# Patient Record
Sex: FEMALE | Race: WHITE | NOT HISPANIC OR LATINO | Employment: UNEMPLOYED | URBAN - METROPOLITAN AREA
[De-identification: names, ages, dates, MRNs, and addresses within clinical notes are randomized per-mention and may not be internally consistent; named-entity substitution may affect disease eponyms.]

---

## 2021-07-29 ENCOUNTER — OFFICE VISIT (OUTPATIENT)
Dept: FAMILY MEDICINE | Facility: CLINIC | Age: 26
End: 2021-07-29
Payer: COMMERCIAL

## 2021-07-29 VITALS
TEMPERATURE: 97.7 F | RESPIRATION RATE: 14 BRPM | BODY MASS INDEX: 43.02 KG/M2 | HEIGHT: 65 IN | DIASTOLIC BLOOD PRESSURE: 83 MMHG | WEIGHT: 258.2 LBS | HEART RATE: 89 BPM | SYSTOLIC BLOOD PRESSURE: 114 MMHG | OXYGEN SATURATION: 98 %

## 2021-07-29 DIAGNOSIS — R59.0 LYMPHADENOPATHY, INGUINAL: ICD-10-CM

## 2021-07-29 DIAGNOSIS — Z00.00 ROUTINE GENERAL MEDICAL EXAMINATION AT A HEALTH CARE FACILITY: Primary | ICD-10-CM

## 2021-07-29 PROCEDURE — 99385 PREV VISIT NEW AGE 18-39: CPT | Performed by: FAMILY MEDICINE

## 2021-07-29 PROCEDURE — 99213 OFFICE O/P EST LOW 20 MIN: CPT | Mod: 25 | Performed by: FAMILY MEDICINE

## 2021-07-29 RX ORDER — DROSPIRENONE AND ETHINYL ESTRADIOL 0.02-3(28)
1 KIT ORAL DAILY
COMMUNITY
End: 2022-11-21

## 2021-07-29 ASSESSMENT — MIFFLIN-ST. JEOR: SCORE: 1910.19

## 2021-07-29 NOTE — PROGRESS NOTES
SUBJECTIVE:   CC: Ashley Darnell is an 25 year old woman who presents for preventive health visit.     Patient presents with:  Physical: Swollen lymph node in the right groin. Patient states she first noticed this one year ago, has shrunk after removal of IUD, but still about the size of an almond.     Patient has been advised of split billing requirements and indicates understanding: Yes     Healthy Habits:    Do you get at least three servings of calcium containing foods daily (dairy, green leafy vegetables, etc.)? yes    Amount of exercise or daily activities, outside of work: Walks; just got a [employer] bike    Problems taking medications regularly No    Medication side effects: No`    Have you had an eye exam in the past two years? yes    Do you see a dentist twice per year? yes    Do you have sleep apnea, excessive snoring or daytime drowsiness?no - but could be sleeping better; melatonin helps as needed      Today's PHQ-2 Score:   PHQ-2 ( 1999 Pfizer) 7/29/2021   Q1: Little interest or pleasure in doing things 1   Q2: Feeling down, depressed or hopeless 1   PHQ-2 Score 2     Abuse: Current or Past(Physical, Sexual or Emotional)- No  Do you feel safe in your environment? Yes    Have you ever done Advance Care Planning? (For example, a Health Directive, POLST, or a discussion with a medical provider or your loved ones about your wishes): No, advance care planning information given to patient to review.  Patient plans to discuss their wishes with loved ones or provider.      Social History     Tobacco Use     Smoking status: Never Smoker     Smokeless tobacco: Never Used   Substance Use Topics     Alcohol use: Yes     Comment: Very infrequently      If you drink alcohol do you typically have >3 drinks per day or >7 drinks per week? No                     Reviewed orders with patient.  Reviewed health maintenance and updated orders accordingly - Yes    FHS-7: No flowsheet data found.    Patient under 40 years  "of age: Routine Mammogram Screening not recommended.   Pertinent mammograms are reviewed under the imaging tab.    Pertinent mammograms are reviewed under the imaging tab.  History of abnormal Pap smear: NO - age 21-29 PAP every 3 years recommended     Reviewed and updated as needed this visit by clinical staff  Tobacco  Allergies  Meds   Med Hx  Surg Hx  Fam Hx  Soc Hx        Reviewed and updated as needed this visit by Provider                  IUD partially expelled.   Lymph node. Last July noticed a giagantic lymph node while in the shower. Never painful. Didn't think much of it.     Did not get biopsy - shrunk after IUD removal - grape sized to peanut.   NO bleeding   No sweats/weight loss/rashes    Pap - 2 normals; next in 2023      ROS:  CONSTITUTIONAL: NEGATIVE for fever, chills, change in weight  INTEGUMENTARY/SKIN: Lymph node enlarged.   EYES: NEGATIVE for vision changes or irritation  ENT: NEGATIVE for ear, mouth and throat problems  RESP: NEGATIVE for significant cough or SOB  BREAST: NEGATIVE for masses, tenderness or discharge  CV: NEGATIVE for chest pain, palpitations or peripheral edema  GI: NEGATIVE for nausea, abdominal pain, heartburn, or change in bowel habits  : NEGATIVE for unusual urinary or vaginal symptoms. Periods are regular.  MUSCULOSKELETAL: NEGATIVE for significant arthralgias or myalgia  NEURO: NEGATIVE for weakness, dizziness or paresthesias  PSYCHIATRIC: NEGATIVE for changes in mood or affect    OBJECTIVE:   /83 (BP Location: Left arm, Patient Position: Sitting, Cuff Size: Adult Large)   Pulse 89   Temp 97.7  F (36.5  C) (Oral)   Resp 14   Ht 1.648 m (5' 4.88\")   Wt 117.1 kg (258 lb 3.2 oz)   LMP 07/15/2021 (Within Days)   SpO2 98%   Breastfeeding No   BMI 43.12 kg/m    EXAM:  GENERAL: healthy, alert and no distress  EYES: Eyes grossly normal to inspection, extraocular movements - intact, and PERRL  HENT: Nose- normal; Mouth- no ulcers, no lesions  NECK: no " "tenderness, no adenopathy, no asymmetry, no masses, no stiffness; thyroid- normal to palpation  RESP: lungs clear to auscultation - no rales, no rhonchi, no wheezes  CV: regular rates and rhythm, normal S1 S2, no S3 or S4 and no murmur, no click or rub -  ABDOMEN: soft, no tenderness, no  hepatosplenomegaly, no masses, normal bowel sounds  MS: extremities- no gross deformities noted, no edema  SKIN: no suspicious lesions, no rashes  NEURO: strength and tone- normal, sensory exam- grossly normal, mentation- intact, speech- normal  PSYCH: Alert and oriented times 3; speech- coherent , normal rate and volume; able to articulate logical thoughts, able to abstract reason, no tangential thoughts, no hallucinations or delusions, affect- normal  LYMPHATICS: ant. cervical- normal, post. cervical- normal, supraclavicular- normal. 1cm rubbery palpable node in the R inguinal region.       ASSESSMENT/PLAN:   1. Routine general medical examination at a health care facility  Discussed HPV and COVID-19 vaccination.   She will consider - may return next week for walk in for COVID vaccine.     2. Lymphadenopathy, inguinal  Reviewed prior imaging and tests - labwork normal (STI screening, CBC), US recommended biopsy. Given improvement, will repeat US first then consider biopsy depending on results.   - US Extremity Non Vascular Right; Future    Patient has been advised of split billing requirements and indicates understanding: Yes  COUNSELING:   Reviewed preventive health counseling, as reflected in patient instructions    Estimated body mass index is 43.12 kg/m  as calculated from the following:    Height as of this encounter: 1.648 m (5' 4.88\").    Weight as of this encounter: 117.1 kg (258 lb 3.2 oz).    She reports that she has never smoked. She has never used smokeless tobacco.      Counseling Resources:  ATP IV Guidelines  Pooled Cohorts Equation Calculator  Breast Cancer Risk Calculator  BRCA-Related Cancer Risk Assessment: " FHS-7 Tool  FRAX Risk Assessment  ICSI Preventive Guidelines  Dietary Guidelines for Americans, 2010  USDA's MyPlate  ASA Prophylaxis  Lung CA Screening    DO EZEQUIEL [facility] APRIL

## 2021-07-29 NOTE — PATIENT INSTRUCTIONS
Nice to see you today!    1) Please call [phone] to schedule your ultrasound   2) Remember that the [facility] will have walk-in appointments for COVID vaccines starting on Monday 8/2/21.       Preventive Health Recommendations  Female Ages 26 - 39  Yearly exam:   See your health care provider every year in order to    Review health changes.     Discuss preventive care.      Review your medicines if you your doctor has prescribed any.    Until age 30: Get a Pap test every three years (more often if you have had an abnormal result).    After age 30: Talk to your doctor about whether you should have a Pap test every 3 years or have a Pap test with HPV screening every 5 years.   You do not need a Pap test if your uterus was removed (hysterectomy) and you have not had cancer.  You should be tested each year for STDs (sexually transmitted diseases), if you're at risk.   Talk to your provider about how often to have your cholesterol checked.  If you are at risk for diabetes, you should have a diabetes test (fasting glucose).  Shots: Get a flu shot each year. Get a tetanus shot every 10 years.   Nutrition:     Eat at least 5 servings of fruits and vegetables each day.    Eat whole-grain bread, whole-wheat pasta and brown rice instead of white grains and rice.    Get adequate Calcium and Vitamin D.     Lifestyle    Exercise at least 150 minutes a week (30 minutes a day, 5 days of the week). This will help you control your weight and prevent disease.    Limit alcohol to one drink per day.    No smoking.     Wear sunscreen to prevent skin cancer.    See your dentist every six months for an exam and cleaning.

## 2021-08-14 ENCOUNTER — HEALTH MAINTENANCE LETTER (OUTPATIENT)
Age: 26
End: 2021-08-14

## 2021-09-01 ENCOUNTER — TELEPHONE (OUTPATIENT)
Dept: FAMILY MEDICINE | Facility: CLINIC | Age: 26
End: 2021-09-01

## 2021-09-01 NOTE — TELEPHONE ENCOUNTER
2021   9:39 AM     Data/Intervention:  Patient Name:  Ashley Darnell  /Age:  1995 (26 year old)    Tried calling: Ashley     Reason for Call:  ACD f/u     Assessment / Plan:  SW reached out to Ashley and wanted to LVM to see if they had any questions or to see if they needed help filling out the Advanced Care Directive Form that Dr. Donald gave them on 21. SW unable to leave voicemail due to phone not connecting to the call twice.     IRINA Arizmendi  Pronouns: She/Her/Hers  , Care Coordination  [facility]  [phone]

## 2021-10-10 ENCOUNTER — HEALTH MAINTENANCE LETTER (OUTPATIENT)
Age: 26
End: 2021-10-10

## 2022-09-18 ENCOUNTER — HEALTH MAINTENANCE LETTER (OUTPATIENT)
Age: 27
End: 2022-09-18

## 2022-10-12 ENCOUNTER — OFFICE VISIT (OUTPATIENT)
Dept: FAMILY MEDICINE | Facility: CLINIC | Age: 27
End: 2022-10-12
Payer: COMMERCIAL

## 2022-10-12 ENCOUNTER — HOSPITAL ENCOUNTER (OUTPATIENT)
Dept: ULTRASOUND IMAGING | Facility: HOSPITAL | Age: 27
Discharge: HOME | End: 2022-10-12
Attending: STUDENT IN AN ORGANIZED HEALTH CARE EDUCATION/TRAINING PROGRAM | Admitting: STUDENT IN AN ORGANIZED HEALTH CARE EDUCATION/TRAINING PROGRAM
Payer: COMMERCIAL

## 2022-10-12 VITALS
OXYGEN SATURATION: 98 % | HEART RATE: 89 BPM | WEIGHT: 293 LBS | BODY MASS INDEX: 49.92 KG/M2 | DIASTOLIC BLOOD PRESSURE: 80 MMHG | RESPIRATION RATE: 20 BRPM | SYSTOLIC BLOOD PRESSURE: 130 MMHG | TEMPERATURE: 98.5 F

## 2022-10-12 DIAGNOSIS — R22.9 SUBCUTANEOUS NODULE: Primary | ICD-10-CM

## 2022-10-12 DIAGNOSIS — R22.9 SUBCUTANEOUS NODULE: ICD-10-CM

## 2022-10-12 LAB — RADIOLOGIST FLAGS: ABNORMAL

## 2022-10-12 PROCEDURE — 99214 OFFICE O/P EST MOD 30 MIN: CPT | Performed by: STUDENT IN AN ORGANIZED HEALTH CARE EDUCATION/TRAINING PROGRAM

## 2022-10-12 PROCEDURE — 76882 US LMTD JT/FCL EVL NVASC XTR: CPT | Mod: RT

## 2022-10-12 ASSESSMENT — PAIN SCALES - GENERAL: PAINLEVEL: MILD PAIN (2)

## 2022-10-12 NOTE — PROGRESS NOTES
"  Assessment and Plan     27-year-old female who presents with subcutaneous nodule in her right groin going on the last 2 years.  Previously worked up with negative STI screening and ultrasounds that were most concerning for an abnormally enlarged lymph node.  Biopsy was recommended but she did not go through with this at that time.  Size of nodule has waxed and waned since then but recently became quite large again.  Still not painful with no drainage.  On exam today I think this is either a lymph node or possibly a cyst as it is quite fluctuant on exam.  Attempted point-of-care ultrasound but machine was broken.  We will send for formal ultrasound and also an IR consult to either perform drainage or biopsy if abnormally enlarged lymph node.    1. Subcutaneous nodule  - US Hernia Evaluation; Future  - IR Referral; Future    Follow up: PRN  Options for treatment and follow-up care were reviewed with the patient and/or guardian. Ashley Darnell and/or guardian engaged in the decision making process and verbalized understanding of the options discussed and agreed with the final plan.    Dr. Jim Lynn         HPI:   Ashley Darnell is a 27 year old  female who presents for:    Chief Complaint   Patient presents with     lymph node     Swollen lymph node in groin. Started in 2020. Ultrasound done at that time, IUD was expelled into cervix. IUD removed and lymph node shrunk. Hasn't gone away completely and grew a little bit in the last few weeks. No pain until patient manually checked it. Size goes up and down about a mm. Bee sting on left leg. Not sure if relevant to issue. Has been exercise regularly in the last few weeks as well.      patient tellls me that in 2020 she noticed a \"swollen lymph node\" in her groin.  She had a ultrasound of her pelvis and found that IUD was partially expelled. US looked like lymph node. IUD was removed and lymph node shrunk some. Seemd ed to wax and wane over the years. Grown recently " and now the size of the gumball. Only changes she has had in thelast two weks was walking daily and using peleton now.  Denies concerns for sexually transmitted diseases and states she was initially tested when this for started about 2 years ago.  All normal.  She has not had any urinary symptoms or vaginal symptoms lately.    Answers for HPI/ROS submitted by the patient on 10/12/2022  What is the reason for your visit today? : Swollen Lymph Node  How many servings of fruits and vegetables do you eat daily?: 0-1  On average, how many sweetened beverages do you drink each day (Examples: soda, juice, sweet tea, etc.  Do NOT count diet or artificially sweetened beverages)?: 0  How many minutes a day do you exercise enough to make your heart beat faster?: 20 to 29  How many days a week do you exercise enough to make your heart beat faster?: 5  How many days per week do you miss taking your medication?: 0         PMHX:   There is no problem list on file for this patient.      Current Outpatient Medications   Medication Sig Dispense Refill     drospirenone-ethinyl estradiol (BC) 3-0.02 MG tablet Take 1 tablet by mouth daily (Patient not taking: Reported on 10/12/2022)         Social History     Tobacco Use     Smoking status: Never     Smokeless tobacco: Never   Substance Use Topics     Alcohol use: Yes     Comment: Very infrequently        Social History     Social History Narrative     Not on file       No Known Allergies    No results found for this or any previous visit (from the past 24 hour(s)).         Review of Systems:    ROS: 10 point ROS neg other than the symptoms noted above in the HPI.         Physical Exam:     Vitals:    10/12/22 1200   BP: 130/80   BP Location: Right arm   Patient Position: Sitting   Cuff Size: Adult Large   Pulse: 89   Resp: 20   Temp: 98.5  F (36.9  C)   TempSrc: Temporal   SpO2: 98%   Weight: 135.6 kg (298 lb 14.4 oz)     Body mass index is 49.92 kg/m .    General appearance: Alert,  cooperative, no distress, appears stated age  Head: Normocephalic, atraumatic, without obvious abnormality  Eyes: Pupils equal round, reactive.  Conjunctiva clear.  Nose: Nares normal, no drainage.  Throat: Lips, mucosa, tongue normal mucosa pink and moist  Neck: Supple, symmetric, trachea midline  Uro/Gyn:  Inspection of external genitalia shows a normal anatomic appearance with pubic hair noted. No atrophy or sclerosis noted. Large 3 cm diameter approximate subcutaneous nodule in right groin that is fluctuant to palpation, mildly reddened but not painful.  Attempted to do point-of-care ultrasound but machine was broken in clinic.

## 2022-10-19 ENCOUNTER — MYC MEDICAL ADVICE (OUTPATIENT)
Dept: INTERVENTIONAL RADIOLOGY/VASCULAR | Facility: CLINIC | Age: 27
End: 2022-10-19

## 2022-10-19 NOTE — TELEPHONE ENCOUNTER
Writer spoke with Ashley regarding planned procedure with IR via telephone.  She acknowledges understanding of pre-procedure instructions.   Ashley plans on a home rapid COVID test prior to procedure date.  I have provided Ashley with IR number ([phone]) for questions or concerns.    Lorie WESTFALL  Interventional Radiology RN   [phone]

## 2022-10-27 ENCOUNTER — HOSPITAL ENCOUNTER (OUTPATIENT)
Dept: ULTRASOUND IMAGING | Facility: HOSPITAL | Age: 27
Discharge: HOME | End: 2022-10-27
Attending: STUDENT IN AN ORGANIZED HEALTH CARE EDUCATION/TRAINING PROGRAM | Admitting: RADIOLOGY
Payer: COMMERCIAL

## 2022-10-27 DIAGNOSIS — R22.9 SUBCUTANEOUS NODULE: ICD-10-CM

## 2022-10-27 PROCEDURE — 88333 PATH CONSLTJ SURG CYTO XM 1: CPT | Mod: TC | Performed by: STUDENT IN AN ORGANIZED HEALTH CARE EDUCATION/TRAINING PROGRAM

## 2022-10-27 PROCEDURE — 87102 FUNGUS ISOLATION CULTURE: CPT | Performed by: STUDENT IN AN ORGANIZED HEALTH CARE EDUCATION/TRAINING PROGRAM

## 2022-10-27 PROCEDURE — 38505 NEEDLE BIOPSY LYMPH NODES: CPT

## 2022-10-27 PROCEDURE — 88341 IMHCHEM/IMCYTCHM EA ADD ANTB: CPT | Mod: 26 | Performed by: PATHOLOGY

## 2022-10-27 PROCEDURE — 88360 TUMOR IMMUNOHISTOCHEM/MANUAL: CPT | Mod: 26 | Performed by: PATHOLOGY

## 2022-10-27 PROCEDURE — 87116 MYCOBACTERIA CULTURE: CPT | Performed by: STUDENT IN AN ORGANIZED HEALTH CARE EDUCATION/TRAINING PROGRAM

## 2022-10-27 PROCEDURE — 88333 PATH CONSLTJ SURG CYTO XM 1: CPT | Mod: 26 | Performed by: PATHOLOGY

## 2022-10-27 PROCEDURE — 87206 SMEAR FLUORESCENT/ACID STAI: CPT | Performed by: STUDENT IN AN ORGANIZED HEALTH CARE EDUCATION/TRAINING PROGRAM

## 2022-10-27 PROCEDURE — 272N000221 US BIOPSY CORE LYMPH NODE

## 2022-10-27 PROCEDURE — 88305 TISSUE EXAM BY PATHOLOGIST: CPT | Mod: 26 | Performed by: PATHOLOGY

## 2022-10-27 PROCEDURE — 87070 CULTURE OTHR SPECIMN AEROBIC: CPT | Performed by: STUDENT IN AN ORGANIZED HEALTH CARE EDUCATION/TRAINING PROGRAM

## 2022-10-27 PROCEDURE — 87075 CULTR BACTERIA EXCEPT BLOOD: CPT | Performed by: STUDENT IN AN ORGANIZED HEALTH CARE EDUCATION/TRAINING PROGRAM

## 2022-10-27 PROCEDURE — 88313 SPECIAL STAINS GROUP 2: CPT | Mod: 26 | Performed by: PATHOLOGY

## 2022-10-27 PROCEDURE — 87077 CULTURE AEROBIC IDENTIFY: CPT | Performed by: STUDENT IN AN ORGANIZED HEALTH CARE EDUCATION/TRAINING PROGRAM

## 2022-10-27 PROCEDURE — 88305 TISSUE EXAM BY PATHOLOGIST: CPT | Mod: TC | Performed by: STUDENT IN AN ORGANIZED HEALTH CARE EDUCATION/TRAINING PROGRAM

## 2022-10-27 PROCEDURE — 88342 IMHCHEM/IMCYTCHM 1ST ANTB: CPT | Mod: 26 | Performed by: PATHOLOGY

## 2022-10-28 ENCOUNTER — MYC MEDICAL ADVICE (OUTPATIENT)
Dept: FAMILY MEDICINE | Facility: CLINIC | Age: 27
End: 2022-10-28

## 2022-10-28 NOTE — TELEPHONE ENCOUNTER
"I called patient. I explained to her that the \"result\" she is seeing in her My Chart is notes from the biopsy procedure itself. I assured patient that Dr. Estrada will be in contact with the patient once he receives the results. Patient verbalizes understanding and has no further questions.   "

## 2022-10-31 LAB — BACTERIA TISS BX CULT: ABNORMAL

## 2022-10-31 NOTE — RESULT ENCOUNTER NOTE
I called and spoke with the patient about their recent clinic visit results. I answered any questions they had.  Patient tells me that since I saw the subcutaneous nodule has significantly decreased in size and is not painful red and with no discharge.  I think culture represents a contaminant and is likely not an abscess.  Thus he will not start any antibiotic.  Awaiting cytology results.      Dr. Jim Lynn

## 2022-11-04 LAB — BACTERIA TISS BX CULT: NORMAL

## 2022-11-11 ENCOUNTER — TELEPHONE (OUTPATIENT)
Dept: FAMILY MEDICINE | Facility: CLINIC | Age: 27
End: 2022-11-11

## 2022-11-11 NOTE — TELEPHONE ENCOUNTER
Reason for call:  Other     Patient called regarding (reason for call): call back    Additional comments: Dr. Wren called to speak with Dr. Estrada. States this is a lymph node biopsy from R groin and it's a fairly challenging lesion. Thinks it's a neoplasm, sent to [facility] for further workup. Wants to be sure it's not a vascular neoplasm. He isn't comfortable offering a differential dx at this time. The number he gave is to [facility] and he'll be at [facility] next week; he said the person answering the phone will be able to get the call over to him.

## 2022-11-14 NOTE — TELEPHONE ENCOUNTER
Called number provided.  Dr. Wren was not available.  I gave my personal cell phone number to have that physician call me back to discuss this case.    Jim Lynn MD

## 2022-11-15 ENCOUNTER — TELEPHONE (OUTPATIENT)
Dept: FAMILY MEDICINE | Facility: CLINIC | Age: 27
End: 2022-11-15

## 2022-11-15 NOTE — TELEPHONE ENCOUNTER
Reason for call:  Other     Patient called regarding (reason for call): call back    Additional comments: Mom calling to request someone call pt to discuss biopsy results. Reports pt has been up at night vomiting due to anxiety.     Phone number to reach patient:  Cell number on file:    Telephone Information:   Mobile [phone]       Best Time:  Any    Can we leave a detailed message on this number?  YES

## 2022-11-21 DIAGNOSIS — D49.89 HISTIOCYTOMA: Primary | ICD-10-CM

## 2022-11-22 ENCOUNTER — PATIENT OUTREACH (OUTPATIENT)
Dept: ONCOLOGY | Facility: CLINIC | Age: 27
End: 2022-11-22

## 2022-11-22 NOTE — PROGRESS NOTES
New Patient Oncology Nurse Navigator Note     Referring provider: Dr. Estrada     Referring Clinic/Organization: [facility]     Referred to: Surgical Oncology - Soft Tissue      Requested provider (if applicable): First available provider    Referral Received: 11/22/22       Evaluation for : right groin mass        Clinical History (per Nurse review of records provided):      See book marked documents:       Referring MD office note    Pathology report    Imaging reports     No results found for: , ALBUMIN, AST, ALT, ALKPHOS, BILITOTAL, LDPLT, WBC, LDRBC       Past Medical History:   Diagnosis Date     LGSIL on Pap smear of cervix 08/2018    plan cotesting 8/2019       No past surgical history on file.    No current outpatient medications on file.         No Known Allergies       There is no problem list on file for this patient.        Clinical Assessment / Barriers to Care (Per Nurse):    None at this time.     Records Location:     [facility]     Records Needed:     NONE AT THIS TIME      Additional testing needed prior to consult:     NONE AT THIS TIME    Referral updates and Plan:       Consult with Surgical Oncology     Alyssa Darnell RN, BSN   Surgical Oncology New Patient Nurse Navigator  [facility]  [phone]

## 2022-11-25 LAB — BACTERIA TISS BX CULT: NO GROWTH

## 2022-12-08 ENCOUNTER — OFFICE VISIT (OUTPATIENT)
Dept: RADIATION THERAPY | Facility: OUTPATIENT CENTER | Age: 27
End: 2022-12-08
Attending: STUDENT IN AN ORGANIZED HEALTH CARE EDUCATION/TRAINING PROGRAM
Payer: MEDICAID

## 2022-12-08 VITALS
RESPIRATION RATE: 16 BRPM | WEIGHT: 293 LBS | BODY MASS INDEX: 50.77 KG/M2 | HEART RATE: 99 BPM | OXYGEN SATURATION: 99 %

## 2022-12-08 DIAGNOSIS — D49.89 HISTIOCYTOMA: ICD-10-CM

## 2022-12-08 ASSESSMENT — PAIN SCALES - GENERAL: PAINLEVEL: NO PAIN (0)

## 2022-12-08 NOTE — NURSING NOTE
"Oncology Rooming Note    December 8, 2022 12:09 PM   Ashley Darnell is a 27 year old female who presents for:    Chief Complaint   Patient presents with     Oncology Clinic Visit     New Surgical Oncology Consult with Dr. Nunez- R groin mass- histiocytoma     Initial Vitals: Pulse 99   Resp 16   Wt 137.9 kg (304 lb)   SpO2 99%   BMI 50.77 kg/m   Estimated body mass index is 50.77 kg/m  as calculated from the following:    Height as of 7/29/21: 1.648 m (5' 4.88\").    Weight as of this encounter: 137.9 kg (304 lb). Body surface area is 2.51 meters squared.  No Pain (0) Comment: Data Unavailable   No LMP recorded.  Allergies reviewed: Yes  Medications reviewed: Yes    Medications: Medication refills not needed today.  Pharmacy name entered into [employer]: [id number] - [place], [place] - [id number]    Clinical concerns: New Surgical Oncology Consult- R groin mass- histiocytoma Dr. Nunez was notified.      Sonya Elkins RN              "

## 2022-12-08 NOTE — PROGRESS NOTES
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  Ashley first noticed a lump there more than 2 years ago.  At the time, she thought it was a lymph node because she was having some difficulty with her IUD.  The lump was evaluated with an ultrasound.  This was thought to be a benign lymph node and the mass decreased in size.  However, more recently, it began to increase again.  She had an ultrasound on 10/12 which demonstrated a 3.9 cm mass and she underwent a fine-needle aspiration which demonstrated a deep fibrous histiocytoma.  Pathology was reviewed at the [facility].  She is now here to talk about treatment options.  She states that it does not hurt her.  She has no symptoms.  She has had no previous similar masses.    PHYSICAL EXAMINATION:  She has a visible subcutaneous nodule high in the right groin and does not appear to be very deep.    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    PLAN:  I have recommended complete excision of this growing lesion.  I have told her that the major risk of this procedure is infection.  We will tentatively schedule her for an excision of this lesion.    Total time was 30 minutes which included reviewing her imaging, in-person visit and coordinating her care.

## 2022-12-08 NOTE — LETTER
12/8/2022         RE: Ashley Darnell  [address]  [place] [address]        Dear Colleague,    Thank you for referring your patient, Ashley Darnell, to the [facility]. Please see a copy of my visit note below.    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  Ashley first noticed a lump there more than 2 years ago.  At the time, she thought it was a lymph node because she was having some difficulty with her IUD.  The lump was evaluated with an ultrasound.  This was thought to be a benign lymph node and the mass decreased in size.  However, more recently, it began to increase again.  She had an ultrasound on 10/12 which demonstrated a 3.9 cm mass and she underwent a fine-needle aspiration which demonstrated a deep fibrous histiocytoma.  Pathology was reviewed at the [facility].  She is now here to talk about treatment options.  She states that it does not hurt her.  She has no symptoms.  She has had no previous similar masses.    PHYSICAL EXAMINATION:  She has a visible subcutaneous nodule high in the right groin and does not appear to be very deep.    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    PLAN:  I have recommended complete excision of this growing lesion.  I have told her that the major risk of this procedure is infection.  We will tentatively schedule her for an excision of this lesion.    Total time was 30 minutes which included reviewing her imaging, in-person visit and coordinating her care.      Again, thank you for allowing me to participate in the care of your patient.      Sincerely,      Tam Nunez MD

## 2022-12-15 ENCOUNTER — PATIENT OUTREACH (OUTPATIENT)
Dept: RADIATION THERAPY | Facility: OUTPATIENT CENTER | Age: 27
End: 2022-12-15

## 2022-12-15 ENCOUNTER — PREP FOR PROCEDURE (OUTPATIENT)
Dept: RADIATION THERAPY | Facility: OUTPATIENT CENTER | Age: 27
End: 2022-12-15

## 2022-12-15 DIAGNOSIS — D49.89 HISTIOCYTOMA: Primary | ICD-10-CM

## 2022-12-15 NOTE — TELEPHONE ENCOUNTER
I contacted the patient via telephone to confirm the scheduled dates and provide the following information:     Surgeon/surgery date/location: Excision of Right groin mass with Dr. Nunez on Thursday, December 29, 2022 at 10:00 am at [facility].     Arrival: Informed patient that a pre-op RN will contact her 1-2 days prior to surgery to review arrival time.    Pre-op consult: Dr. Nunez on 12/8/22    Pre-op physical with: Patient will contact PCP to schedule.     COVID-19 test: Patient was informed she does not need to do a COVID test for same day surgery, she is to call if she develops symptoms of COVID before her surgery date.     Post-op: 1/12/22 at 9:15 am    The surgery packet was provided during consult in clinic.     Sonya Elkins RN Surgical Oncology

## 2022-12-23 LAB
ACID FAST STAIN (ARUP): NORMAL

## 2022-12-27 ENCOUNTER — OFFICE VISIT (OUTPATIENT)
Dept: FAMILY MEDICINE | Facility: CLINIC | Age: 27
End: 2022-12-27
Payer: MEDICAID

## 2022-12-27 VITALS
DIASTOLIC BLOOD PRESSURE: 80 MMHG | OXYGEN SATURATION: 99 % | SYSTOLIC BLOOD PRESSURE: 128 MMHG | BODY MASS INDEX: 48.82 KG/M2 | WEIGHT: 293 LBS | RESPIRATION RATE: 16 BRPM | HEART RATE: 81 BPM | HEIGHT: 65 IN

## 2022-12-27 DIAGNOSIS — D49.89 HISTIOCYTOMA: Primary | ICD-10-CM

## 2022-12-27 DIAGNOSIS — Z11.4 SCREENING FOR HIV (HUMAN IMMUNODEFICIENCY VIRUS): ICD-10-CM

## 2022-12-27 DIAGNOSIS — Z01.818 PREOP GENERAL PHYSICAL EXAM: ICD-10-CM

## 2022-12-27 DIAGNOSIS — Z11.59 NEED FOR HEPATITIS C SCREENING TEST: ICD-10-CM

## 2022-12-27 DIAGNOSIS — T75.3XXS MOTION SICKNESS, SEQUELA: ICD-10-CM

## 2022-12-27 DIAGNOSIS — F40.232 FEAR OF OTHER MEDICAL CARE: ICD-10-CM

## 2022-12-27 PROCEDURE — 99214 OFFICE O/P EST MOD 30 MIN: CPT | Performed by: FAMILY MEDICINE

## 2022-12-27 RX ORDER — ONDANSETRON 4 MG/1
4 TABLET, ORALLY DISINTEGRATING ORAL EVERY 8 HOURS PRN
Qty: 30 TABLET | Refills: 0 | Status: SHIPPED | OUTPATIENT
Start: 2022-12-27

## 2022-12-27 RX ORDER — HYDROXYZINE HYDROCHLORIDE 10 MG/1
10-30 TABLET, FILM COATED ORAL EVERY 6 HOURS PRN
Qty: 30 TABLET | Refills: 0 | Status: SHIPPED | OUTPATIENT
Start: 2022-12-27

## 2022-12-27 NOTE — H&P (VIEW-ONLY)
[address] [address]  Phone: [phone]  Fax: [phone]  Primary Provider: No Ref-Primary, Physician  Pre-op Performing Provider: GABRIELA BASURTO      PREOPERATIVE EVALUATION:  Today's date: 12/27/2022    Ashley Darnell is a 27 year old female who presents for a preoperative evaluation.    Surgical Information:  Surgery/Procedure: deep cytoma removal  Surgery Location: [facility]  Surgeon: Dr. Nunez  Surgery Date: 12/29  Time of Surgery:   Where patient plans to recover: At home with family  Fax number for surgical facility: Note does not need to be faxed, will be available electronically in Epic.    Type of Anesthesia Anticipated: to be determined    Assessment & Plan     The proposed surgical procedure is considered LOW risk.    Problem List Items Addressed This Visit    None  Visit Diagnoses     Histiocytoma    -  Primary    Screening for HIV (human immunodeficiency virus)        Need for hepatitis C screening test        Fear of other medical care        Relevant Medications    hydrOXYzine (ATARAX) 10 MG tablet    Motion sickness, sequela        Relevant Medications    ondansetron (ZOFRAN ODT) 4 MG ODT tab    Preop general physical exam            Patient is an ASA category 3 based on weight.  Otherwise no chronic medical conditions that put her at a high risk for surgery.  She does have a notable fear of medical care.  We are going to give her some hydroxyzine that she can use as needed for this leading up to surgery.  Also explained to her that if she needs narcotics after surgery and has some itchiness this might help with the itchiness also.    She has a history of motion sickness and her mom is always reacted poorly to anesthesia with a lot of nausea and vomiting.  I am giving her some ondansetron IM to try for her motion sickness.  May be worthwhile with treating her with some ondansetron prior to surgery also.            RECOMMENDATION:  APPROVAL GIVEN to proceed with proposed procedure, without further diagnostic evaluation.            Subjective     HPI related to upcoming procedure: pt has a histiocytosis that needs excision.  She also reports that she occasionally experiences motion sickness which may be a consideration for anesthesia.  Her dad has history of venal thrombosis disease.  No known mutations.  He has been anticoagulated since his 30s.  Patient has never had blood clot.    Preop Questions 12/27/2022   1. Have you ever had a heart attack or stroke? No   2. Have you ever had surgery on your heart or blood vessels, such as a stent placement, a coronary artery bypass, or surgery on an artery in your head, neck, heart, or legs? No   3. Do you have chest pain with activity? No   4. Do you have a history of  heart failure? No   5. Do you currently have a cold, bronchitis or symptoms of other infection? No   6. Do you have a cough, shortness of breath, or wheezing? No   7. Do you or anyone in your family have previous history of blood clots? YES    8. Do you or does anyone in your family have a serious bleeding problem such as prolonged bleeding following surgeries or cuts? No   9. Have you ever had problems with anemia or been told to take iron pills? No   10. Have you had any abnormal blood loss such as black, tarry or bloody stools, or abnormal vaginal bleeding? No   11. Have you ever had a blood transfusion? No   12. Are you willing to have a blood transfusion if it is medically needed before, during, or after your surgery? Yes   13. Have you or any of your relatives ever had problems with anesthesia? No   14. Do you have sleep apnea, excessive snoring or daytime drowsiness? No   15. Do you have any artifical heart valves or other implanted medical devices like a pacemaker, defibrillator, or continuous glucose monitor? No   16. Do you have artificial joints? No   17. Are you allergic to latex? No   18. Is there  "any chance that you may be pregnant? No       Health Care Directive:  Patient does not have a Health Care Directive or Living Will: Discussed advance care planning with patient; information given to patient to review.    Preoperative Review of :   reviewed - no record of controlled substances prescribed.  {Review MNPMP for all patients per [facility] MNPMP Profile:  Review of Systems  Neg except as per HPI    There are no problems to display for this patient.     Past Medical History:   Diagnosis Date     LGSIL on Pap smear of cervix 08/2018    plan cotesting 8/2019     Motion sickness      History reviewed. No pertinent surgical history.  No previous surgeries  Current Outpatient Medications   Medication Sig Dispense Refill     hydrOXYzine (ATARAX) 10 MG tablet Take 1-3 tablets (10-30 mg) by mouth every 6 hours as needed for itching or anxiety 30 tablet 0     ondansetron (ZOFRAN ODT) 4 MG ODT tab Take 1 tablet (4 mg) by mouth every 8 hours as needed for nausea or vomiting 30 tablet 0   no current meds, encouraged pt to take folic acid 400 mcg daily    No Known Allergies     Social History     Tobacco Use     Smoking status: Never     Smokeless tobacco: Never   Substance Use Topics     Alcohol use: Yes     Comment: Very infrequently      Family History   Problem Relation Age of Onset     Anxiety Disorder Mother      Blood Disease Father         Blood clotting disorder      Diabetes Type 2  Father      Colon Cancer Maternal Grandmother      Breast Cancer Paternal Grandmother      History   Drug Use Not on file         Objective     /80 (BP Location: Right arm, Patient Position: Sitting)   Pulse 81   Resp 16   Ht 1.645 m (5' 4.75\")   Wt 136.9 kg (301 lb 12.8 oz)   SpO2 99%   BMI 50.61 kg/m      Physical Exam        General: alert and oriented ×3 no acute distress.    HEENT: Normocephalic and atraumatic.   Eyes pupils are equal round and reactive to light extraocular motion is intact. normal " conjunctiva    Hearing is grossly normal and there is no otorrhea. Tympanic membranes are pearly grey with a normal light reflex.    Nares are patent there is no rhinorrhea.     Mucous membranes are moist and pink.    Chest: has bilateral rise with no increased work of breathing. clear to auscultation without wheezes, rhonchi, or rales.    Cardiovascular: normal perfusion and brisk capillary refill. S1S2 with regular rate and rhythm and no murmurs, gallops or rubs.    Musculoskeletal: no gross focal abnormalities and normal gait.    Neuro: no gross focal abnormalities and memory seems intact. CN 2-12 are grossly intact.    Psychiatric: speech is clear and coherent and fluent. Patient dressed appropriately for the weather. Mood is appropriate and affect is full.        No results for input(s): HGB, PLT, INR, NA, POTASSIUM, CR, A1C in the last 24343 hours.     Diagnostics:  No labs were ordered during this visit.   No EKG required for low risk surgery (cataract, skin procedure, breast biopsy, etc).    Revised Cardiac Risk Index (RCRI):  The patient has the following serious cardiovascular risks for perioperative complications:   - No serious cardiac risks = 0 points     RCRI Interpretation: 0 points: Class I (very low risk - 0.4% complication rate)           Signed Electronically by: Nadege Jenkins MD  Copy of this evaluation report is provided to requesting physician.

## 2022-12-27 NOTE — PROGRESS NOTES
[address] [address]  Phone: [phone]  Fax: [phone]  Primary Provider: No Ref-Primary, Physician  Pre-op Performing Provider: GABRIELA BASURTO      PREOPERATIVE EVALUATION:  Today's date: 12/27/2022    Ashley Darnell is a 27 year old female who presents for a preoperative evaluation.    Surgical Information:  Surgery/Procedure: deep cytoma removal  Surgery Location: [facility]  Surgeon: Dr. Nunez  Surgery Date: 12/29  Time of Surgery:   Where patient plans to recover: At home with family  Fax number for surgical facility: Note does not need to be faxed, will be available electronically in Epic.    Type of Anesthesia Anticipated: to be determined    Assessment & Plan     The proposed surgical procedure is considered LOW risk.    Problem List Items Addressed This Visit    None  Visit Diagnoses     Histiocytoma    -  Primary    Screening for HIV (human immunodeficiency virus)        Need for hepatitis C screening test        Fear of other medical care        Relevant Medications    hydrOXYzine (ATARAX) 10 MG tablet    Motion sickness, sequela        Relevant Medications    ondansetron (ZOFRAN ODT) 4 MG ODT tab    Preop general physical exam            Patient is an ASA category 3 based on weight.  Otherwise no chronic medical conditions that put her at a high risk for surgery.  She does have a notable fear of medical care.  We are going to give her some hydroxyzine that she can use as needed for this leading up to surgery.  Also explained to her that if she needs narcotics after surgery and has some itchiness this might help with the itchiness also.    She has a history of motion sickness and her mom is always reacted poorly to anesthesia with a lot of nausea and vomiting.  I am giving her some ondansetron IM to try for her motion sickness.  May be worthwhile with treating her with some ondansetron prior to surgery also.            RECOMMENDATION:  APPROVAL GIVEN to proceed with proposed procedure, without further diagnostic evaluation.            Subjective     HPI related to upcoming procedure: pt has a histiocytosis that needs excision.  She also reports that she occasionally experiences motion sickness which may be a consideration for anesthesia.  Her dad has history of venal thrombosis disease.  No known mutations.  He has been anticoagulated since his 30s.  Patient has never had blood clot.    Preop Questions 12/27/2022   1. Have you ever had a heart attack or stroke? No   2. Have you ever had surgery on your heart or blood vessels, such as a stent placement, a coronary artery bypass, or surgery on an artery in your head, neck, heart, or legs? No   3. Do you have chest pain with activity? No   4. Do you have a history of  heart failure? No   5. Do you currently have a cold, bronchitis or symptoms of other infection? No   6. Do you have a cough, shortness of breath, or wheezing? No   7. Do you or anyone in your family have previous history of blood clots? YES    8. Do you or does anyone in your family have a serious bleeding problem such as prolonged bleeding following surgeries or cuts? No   9. Have you ever had problems with anemia or been told to take iron pills? No   10. Have you had any abnormal blood loss such as black, tarry or bloody stools, or abnormal vaginal bleeding? No   11. Have you ever had a blood transfusion? No   12. Are you willing to have a blood transfusion if it is medically needed before, during, or after your surgery? Yes   13. Have you or any of your relatives ever had problems with anesthesia? No   14. Do you have sleep apnea, excessive snoring or daytime drowsiness? No   15. Do you have any artifical heart valves or other implanted medical devices like a pacemaker, defibrillator, or continuous glucose monitor? No   16. Do you have artificial joints? No   17. Are you allergic to latex? No   18. Is there  "any chance that you may be pregnant? No       Health Care Directive:  Patient does not have a Health Care Directive or Living Will: Discussed advance care planning with patient; information given to patient to review.    Preoperative Review of :   reviewed - no record of controlled substances prescribed.  {Review MNPMP for all patients per [facility] MNPMP Profile:  Review of Systems  Neg except as per HPI    There are no problems to display for this patient.     Past Medical History:   Diagnosis Date     LGSIL on Pap smear of cervix 08/2018    plan cotesting 8/2019     Motion sickness      History reviewed. No pertinent surgical history.  No previous surgeries  Current Outpatient Medications   Medication Sig Dispense Refill     hydrOXYzine (ATARAX) 10 MG tablet Take 1-3 tablets (10-30 mg) by mouth every 6 hours as needed for itching or anxiety 30 tablet 0     ondansetron (ZOFRAN ODT) 4 MG ODT tab Take 1 tablet (4 mg) by mouth every 8 hours as needed for nausea or vomiting 30 tablet 0   no current meds, encouraged pt to take folic acid 400 mcg daily    No Known Allergies     Social History     Tobacco Use     Smoking status: Never     Smokeless tobacco: Never   Substance Use Topics     Alcohol use: Yes     Comment: Very infrequently      Family History   Problem Relation Age of Onset     Anxiety Disorder Mother      Blood Disease Father         Blood clotting disorder      Diabetes Type 2  Father      Colon Cancer Maternal Grandmother      Breast Cancer Paternal Grandmother      History   Drug Use Not on file         Objective     /80 (BP Location: Right arm, Patient Position: Sitting)   Pulse 81   Resp 16   Ht 1.645 m (5' 4.75\")   Wt 136.9 kg (301 lb 12.8 oz)   SpO2 99%   BMI 50.61 kg/m      Physical Exam        General: alert and oriented ×3 no acute distress.    HEENT: Normocephalic and atraumatic.   Eyes pupils are equal round and reactive to light extraocular motion is intact. normal " conjunctiva    Hearing is grossly normal and there is no otorrhea. Tympanic membranes are pearly grey with a normal light reflex.    Nares are patent there is no rhinorrhea.     Mucous membranes are moist and pink.    Chest: has bilateral rise with no increased work of breathing. clear to auscultation without wheezes, rhonchi, or rales.    Cardiovascular: normal perfusion and brisk capillary refill. S1S2 with regular rate and rhythm and no murmurs, gallops or rubs.    Musculoskeletal: no gross focal abnormalities and normal gait.    Neuro: no gross focal abnormalities and memory seems intact. CN 2-12 are grossly intact.    Psychiatric: speech is clear and coherent and fluent. Patient dressed appropriately for the weather. Mood is appropriate and affect is full.        No results for input(s): HGB, PLT, INR, NA, POTASSIUM, CR, A1C in the last 55786 hours.     Diagnostics:  No labs were ordered during this visit.   No EKG required for low risk surgery (cataract, skin procedure, breast biopsy, etc).    Revised Cardiac Risk Index (RCRI):  The patient has the following serious cardiovascular risks for perioperative complications:   - No serious cardiac risks = 0 points     RCRI Interpretation: 0 points: Class I (very low risk - 0.4% complication rate)           Signed Electronically by: Nadege Jenkins MD  Copy of this evaluation report is provided to requesting physician.

## 2022-12-28 ENCOUNTER — ANESTHESIA EVENT (OUTPATIENT)
Dept: SURGERY | Facility: CLINIC | Age: 27
End: 2022-12-28
Payer: MEDICAID

## 2022-12-28 NOTE — ANESTHESIA PREPROCEDURE EVALUATION
Anesthesia Pre-Procedure Evaluation    Patient: Ashley Darnell   MRN: [id number] : 1995        Procedure : Procedure(s):  , Excision of Right groin mass          Past Medical History:   Diagnosis Date     LGSIL on Pap smear of cervix 2018    plan cotesting 2019     Motion sickness       History reviewed. No pertinent surgical history.   No Known Allergies   Social History     Tobacco Use     Smoking status: Never     Smokeless tobacco: Never   Substance Use Topics     Alcohol use: Yes     Comment: Very infrequently       Wt Readings from Last 1 Encounters:   22 136.9 kg (301 lb 12.8 oz)        Anesthesia Evaluation   Pt has had prior anesthetic.     History of anesthetic complications  - motion sickness.      ROS/MED HX  ENT/Pulmonary:     (+) SCARLET risk factors, obese,     Neurologic:  - neg neurologic ROS     Cardiovascular:  - neg cardiovascular ROS     METS/Exercise Tolerance:     Hematologic:  - neg hematologic  ROS     Musculoskeletal:  - neg musculoskeletal ROS     GI/Hepatic:  - neg GI/hepatic ROS     Renal/Genitourinary:  - neg Renal ROS     Endo:     (+) Obesity (Extreme morbid),     Psychiatric/Substance Use:  - neg psychiatric ROS     Infectious Disease:  - neg infectious disease ROS     Malignancy:  - neg malignancy ROS     Other:  - neg other ROS          Physical Exam    Airway  airway exam normal      Mallampati: II   TM distance: > 3 FB   Neck ROM: full   Mouth opening: > 3 cm    Respiratory Devices and Support         Dental  no notable dental history         Cardiovascular   cardiovascular exam normal          Pulmonary   pulmonary exam normal                OUTSIDE LABS:  CBC: No results found for: WBC, HGB, HCT, PLT  BMP: No results found for: NA, POTASSIUM, CHLORIDE, CO2, BUN, CR, GLC  COAGS: No results found for: PTT, INR, FIBR  POC: No results found for: BGM, HCG, HCGS  HEPATIC: No results found for: ALBUMIN, PROTTOTAL, ALT, AST, GGT, ALKPHOS, BILITOTAL, BILIDIRECT,  MURPHY  OTHER: No results found for: PH, LACT, A1C, IDALIA, PHOS, MAG, LIPASE, AMYLASE, TSH, T4, T3, CRP, SED    Anesthesia Plan    ASA Status:  3   NPO Status:  NPO Appropriate    Anesthesia Type: General.     - Airway: Native airway   Induction: Intravenous.   Maintenance: TIVA.        Consents    Anesthesia Plan(s) and associated risks, benefits, and realistic alternatives discussed. Questions answered and patient/representative(s) expressed understanding.     - Discussed: Risks, Benefits and Alternatives for BOTH SEDATION and the PROCEDURE were discussed     - Discussed with:  Patient         Postoperative Care    Pain management: Multi-modal analgesia.   PONV prophylaxis: Ondansetron (or other 5HT-3), Dexamethasone or Solumedrol, Scopolamine patch     Comments:                GREGG Harper CRNA

## 2022-12-29 ENCOUNTER — HOSPITAL ENCOUNTER (OUTPATIENT)
Facility: CLINIC | Age: 27
Discharge: HOME | End: 2022-12-29
Attending: SURGERY | Admitting: SURGERY
Payer: MEDICAID

## 2022-12-29 ENCOUNTER — ANESTHESIA (OUTPATIENT)
Dept: SURGERY | Facility: CLINIC | Age: 27
End: 2022-12-29
Payer: MEDICAID

## 2022-12-29 VITALS
TEMPERATURE: 98.7 F | BODY MASS INDEX: 48.82 KG/M2 | SYSTOLIC BLOOD PRESSURE: 122 MMHG | HEART RATE: 78 BPM | OXYGEN SATURATION: 96 % | HEIGHT: 65 IN | WEIGHT: 293 LBS | DIASTOLIC BLOOD PRESSURE: 80 MMHG | RESPIRATION RATE: 16 BRPM

## 2022-12-29 PROCEDURE — 250N000009 HC RX 250: Performed by: NURSE ANESTHETIST, CERTIFIED REGISTERED

## 2022-12-29 PROCEDURE — 271N000001 HC OR GENERAL SUPPLY NON-STERILE: Performed by: SURGERY

## 2022-12-29 PROCEDURE — 250N000011 HC RX IP 250 OP 636: Performed by: SURGERY

## 2022-12-29 PROCEDURE — 272N000001 HC OR GENERAL SUPPLY STERILE: Performed by: SURGERY

## 2022-12-29 PROCEDURE — 258N000003 HC RX IP 258 OP 636: Performed by: NURSE ANESTHETIST, CERTIFIED REGISTERED

## 2022-12-29 PROCEDURE — 11403 EXC TR-EXT B9+MARG 2.1-3CM: CPT | Mod: 51 | Performed by: SURGERY

## 2022-12-29 PROCEDURE — 999N000141 HC STATISTIC PRE-PROCEDURE NURSING ASSESSMENT: Performed by: SURGERY

## 2022-12-29 PROCEDURE — 250N000013 HC RX MED GY IP 250 OP 250 PS 637: Performed by: NURSE ANESTHETIST, CERTIFIED REGISTERED

## 2022-12-29 PROCEDURE — 88305 TISSUE EXAM BY PATHOLOGIST: CPT | Mod: TC | Performed by: SURGERY

## 2022-12-29 PROCEDURE — 710N000012 HC RECOVERY PHASE 2, PER MINUTE: Performed by: SURGERY

## 2022-12-29 PROCEDURE — 360N000075 HC SURGERY LEVEL 2, PER MIN: Performed by: SURGERY

## 2022-12-29 PROCEDURE — 12034 INTMD RPR S/TR/EXT 7.6-12.5: CPT | Performed by: SURGERY

## 2022-12-29 PROCEDURE — 370N000017 HC ANESTHESIA TECHNICAL FEE, PER MIN: Performed by: SURGERY

## 2022-12-29 PROCEDURE — 250N000009 HC RX 250: Performed by: SURGERY

## 2022-12-29 PROCEDURE — 250N000011 HC RX IP 250 OP 636: Performed by: NURSE ANESTHETIST, CERTIFIED REGISTERED

## 2022-12-29 RX ORDER — LIDOCAINE 40 MG/G
CREAM TOPICAL
Status: DISCONTINUED | OUTPATIENT
Start: 2022-12-29 | End: 2022-12-29 | Stop reason: HOSPADM

## 2022-12-29 RX ORDER — GABAPENTIN 300 MG/1
300 CAPSULE ORAL
Status: COMPLETED | OUTPATIENT
Start: 2022-12-29 | End: 2022-12-29

## 2022-12-29 RX ORDER — NALOXONE HYDROCHLORIDE 0.4 MG/ML
0.2 INJECTION, SOLUTION INTRAMUSCULAR; INTRAVENOUS; SUBCUTANEOUS
Status: DISCONTINUED | OUTPATIENT
Start: 2022-12-29 | End: 2022-12-29 | Stop reason: HOSPADM

## 2022-12-29 RX ORDER — HYDROMORPHONE HCL IN WATER/PF 6 MG/30 ML
0.2 PATIENT CONTROLLED ANALGESIA SYRINGE INTRAVENOUS EVERY 5 MIN PRN
Status: DISCONTINUED | OUTPATIENT
Start: 2022-12-29 | End: 2022-12-29 | Stop reason: HOSPADM

## 2022-12-29 RX ORDER — BUPIVACAINE HYDROCHLORIDE AND EPINEPHRINE 2.5; 5 MG/ML; UG/ML
INJECTION, SOLUTION INFILTRATION; PERINEURAL PRN
Status: DISCONTINUED | OUTPATIENT
Start: 2022-12-29 | End: 2022-12-29 | Stop reason: HOSPADM

## 2022-12-29 RX ORDER — KETOROLAC TROMETHAMINE 30 MG/ML
INJECTION, SOLUTION INTRAMUSCULAR; INTRAVENOUS PRN
Status: DISCONTINUED | OUTPATIENT
Start: 2022-12-29 | End: 2022-12-29

## 2022-12-29 RX ORDER — FENTANYL CITRATE 50 UG/ML
INJECTION, SOLUTION INTRAMUSCULAR; INTRAVENOUS PRN
Status: DISCONTINUED | OUTPATIENT
Start: 2022-12-29 | End: 2022-12-29

## 2022-12-29 RX ORDER — DEXAMETHASONE SODIUM PHOSPHATE 4 MG/ML
INJECTION, SOLUTION INTRA-ARTICULAR; INTRALESIONAL; INTRAMUSCULAR; INTRAVENOUS; SOFT TISSUE PRN
Status: DISCONTINUED | OUTPATIENT
Start: 2022-12-29 | End: 2022-12-29

## 2022-12-29 RX ORDER — CEFAZOLIN SODIUM/WATER 3 G/30 ML
3 SYRINGE (ML) INTRAVENOUS
Status: COMPLETED | OUTPATIENT
Start: 2022-12-29 | End: 2022-12-29

## 2022-12-29 RX ORDER — LIDOCAINE HYDROCHLORIDE 10 MG/ML
INJECTION, SOLUTION INFILTRATION; PERINEURAL PRN
Status: DISCONTINUED | OUTPATIENT
Start: 2022-12-29 | End: 2022-12-29

## 2022-12-29 RX ORDER — SODIUM CHLORIDE, SODIUM LACTATE, POTASSIUM CHLORIDE, CALCIUM CHLORIDE 600; 310; 30; 20 MG/100ML; MG/100ML; MG/100ML; MG/100ML
INJECTION, SOLUTION INTRAVENOUS CONTINUOUS
Status: DISCONTINUED | OUTPATIENT
Start: 2022-12-29 | End: 2022-12-29 | Stop reason: HOSPADM

## 2022-12-29 RX ORDER — ONDANSETRON 4 MG/1
4 TABLET, ORALLY DISINTEGRATING ORAL EVERY 30 MIN PRN
Status: DISCONTINUED | OUTPATIENT
Start: 2022-12-29 | End: 2022-12-29 | Stop reason: HOSPADM

## 2022-12-29 RX ORDER — SCOLOPAMINE TRANSDERMAL SYSTEM 1 MG/1
1 PATCH, EXTENDED RELEASE TRANSDERMAL
Status: DISCONTINUED | OUTPATIENT
Start: 2022-12-29 | End: 2022-12-29 | Stop reason: HOSPADM

## 2022-12-29 RX ORDER — ALBUTEROL SULFATE 0.83 MG/ML
2.5 SOLUTION RESPIRATORY (INHALATION) EVERY 4 HOURS PRN
Status: DISCONTINUED | OUTPATIENT
Start: 2022-12-29 | End: 2022-12-29 | Stop reason: HOSPADM

## 2022-12-29 RX ORDER — ACETAMINOPHEN 325 MG/1
975 TABLET ORAL ONCE
Status: COMPLETED | OUTPATIENT
Start: 2022-12-29 | End: 2022-12-29

## 2022-12-29 RX ORDER — FENTANYL CITRATE 50 UG/ML
25 INJECTION, SOLUTION INTRAMUSCULAR; INTRAVENOUS EVERY 5 MIN PRN
Status: DISCONTINUED | OUTPATIENT
Start: 2022-12-29 | End: 2022-12-29 | Stop reason: HOSPADM

## 2022-12-29 RX ORDER — NALOXONE HYDROCHLORIDE 0.4 MG/ML
0.4 INJECTION, SOLUTION INTRAMUSCULAR; INTRAVENOUS; SUBCUTANEOUS
Status: DISCONTINUED | OUTPATIENT
Start: 2022-12-29 | End: 2022-12-29 | Stop reason: HOSPADM

## 2022-12-29 RX ORDER — KETAMINE HYDROCHLORIDE 10 MG/ML
INJECTION INTRAMUSCULAR; INTRAVENOUS PRN
Status: DISCONTINUED | OUTPATIENT
Start: 2022-12-29 | End: 2022-12-29

## 2022-12-29 RX ORDER — ONDANSETRON 2 MG/ML
INJECTION INTRAMUSCULAR; INTRAVENOUS PRN
Status: DISCONTINUED | OUTPATIENT
Start: 2022-12-29 | End: 2022-12-29

## 2022-12-29 RX ORDER — ONDANSETRON 2 MG/ML
4 INJECTION INTRAMUSCULAR; INTRAVENOUS EVERY 30 MIN PRN
Status: DISCONTINUED | OUTPATIENT
Start: 2022-12-29 | End: 2022-12-29 | Stop reason: HOSPADM

## 2022-12-29 RX ORDER — MEPERIDINE HYDROCHLORIDE 25 MG/ML
12.5 INJECTION INTRAMUSCULAR; INTRAVENOUS; SUBCUTANEOUS
Status: DISCONTINUED | OUTPATIENT
Start: 2022-12-29 | End: 2022-12-29 | Stop reason: HOSPADM

## 2022-12-29 RX ORDER — PROPOFOL 10 MG/ML
INJECTION, EMULSION INTRAVENOUS CONTINUOUS PRN
Status: DISCONTINUED | OUTPATIENT
Start: 2022-12-29 | End: 2022-12-29

## 2022-12-29 RX ORDER — FENTANYL CITRATE 50 UG/ML
50 INJECTION, SOLUTION INTRAMUSCULAR; INTRAVENOUS
Status: DISCONTINUED | OUTPATIENT
Start: 2022-12-29 | End: 2022-12-29 | Stop reason: HOSPADM

## 2022-12-29 RX ORDER — CEFAZOLIN SODIUM/WATER 3 G/30 ML
3 SYRINGE (ML) INTRAVENOUS SEE ADMIN INSTRUCTIONS
Status: DISCONTINUED | OUTPATIENT
Start: 2022-12-29 | End: 2022-12-29 | Stop reason: HOSPADM

## 2022-12-29 RX ADMIN — KETOROLAC TROMETHAMINE 30 MG: 30 INJECTION, SOLUTION INTRAMUSCULAR at 10:43

## 2022-12-29 RX ADMIN — SODIUM CHLORIDE, POTASSIUM CHLORIDE, SODIUM LACTATE AND CALCIUM CHLORIDE: 600; 310; 30; 20 INJECTION, SOLUTION INTRAVENOUS at 10:18

## 2022-12-29 RX ADMIN — SCOPALAMINE 1 PATCH: 1 PATCH, EXTENDED RELEASE TRANSDERMAL at 10:00

## 2022-12-29 RX ADMIN — GABAPENTIN 300 MG: 300 CAPSULE ORAL at 09:28

## 2022-12-29 RX ADMIN — DEXAMETHASONE SODIUM PHOSPHATE 10 MG: 4 INJECTION, SOLUTION INTRA-ARTICULAR; INTRALESIONAL; INTRAMUSCULAR; INTRAVENOUS; SOFT TISSUE at 10:22

## 2022-12-29 RX ADMIN — FENTANYL CITRATE 100 MCG: 50 INJECTION, SOLUTION INTRAMUSCULAR; INTRAVENOUS at 10:08

## 2022-12-29 RX ADMIN — ACETAMINOPHEN 975 MG: 325 TABLET, FILM COATED ORAL at 09:28

## 2022-12-29 RX ADMIN — SODIUM CHLORIDE, POTASSIUM CHLORIDE, SODIUM LACTATE AND CALCIUM CHLORIDE: 600; 310; 30; 20 INJECTION, SOLUTION INTRAVENOUS at 10:08

## 2022-12-29 RX ADMIN — Medication 3 G: at 10:08

## 2022-12-29 RX ADMIN — MIDAZOLAM 2 MG: 1 INJECTION INTRAMUSCULAR; INTRAVENOUS at 10:08

## 2022-12-29 RX ADMIN — PROPOFOL 150 MCG/KG/MIN: 10 INJECTION, EMULSION INTRAVENOUS at 10:16

## 2022-12-29 RX ADMIN — PHENYLEPHRINE HYDROCHLORIDE 200 MCG: 10 INJECTION INTRAVENOUS at 10:44

## 2022-12-29 RX ADMIN — KETAMINE HYDROCHLORIDE 50 MG: 10 INJECTION, SOLUTION INTRAMUSCULAR; INTRAVENOUS at 10:24

## 2022-12-29 RX ADMIN — ONDANSETRON 4 MG: 2 INJECTION INTRAMUSCULAR; INTRAVENOUS at 10:22

## 2022-12-29 RX ADMIN — LIDOCAINE HYDROCHLORIDE 50 MG: 10 INJECTION, SOLUTION INFILTRATION; PERINEURAL at 10:08

## 2022-12-29 ASSESSMENT — ACTIVITIES OF DAILY LIVING (ADL)
ADLS_ACUITY_SCORE: 35
ADLS_ACUITY_SCORE: 33

## 2022-12-29 NOTE — DISCHARGE INSTRUCTIONS
Same Day Surgery Discharge Instructions  Special Precautions After Surgery - Adult    It is not unusual to feel lightheaded or faint, up to 24 hours after surgery or while taking pain medication.  If you have these symptoms; sit for a few minutes before standing and have someone assist you when getting up.  You should rest and relax for the next 24 hours and must have someone stay with you for at least 24 hours after your discharge.  DO NOT DRIVE any vehicle or operate mechanical equipment for 24 hours following the end of your surgery.  DO NOT DRIVE while taking narcotic pain medications that have been prescribed by your physician.  If you had a limb operated on, you must be able to use it fully to drive.  DO NOT drink alcoholic beverages for 24 hours following surgery or while taking prescription pain medication.  Drink clear liquids (apple juice, ginger ale, broth, 7-Up, etc.).  Progress to your regular diet as you feel able.  Any questions call your physician and do not make important decisions for 24 hours.    ACTIVITY  Rest today.  No activity or diet restrictions.     INCISIONAL CARE  May bathe / shower after 24 hours.  Apply ice 1/2 hour on and 1/2 hour off while awake.  Be alert for signs of infection:  redness, swelling, heat, drainage of pus, and/or elevated temperature.  Contact your doctor if these occur.            Medications:  Acetaminophen (Tylenol):  Next dose: 3:30PM.  Ibuprofen (Motrin, Advil):  Next dose: 5PM.  Follow the instructions on the bottle.       __________________________________________________________________________________________________________________________________  IMPORTANT NUMBERS:    [facility] [place] Main Number:  [phone], [phone]  Pharmacy:  [phone]  Same Day Surgery:  [phone], Monday - Friday until 8:30 p.m.  [facility]:  [phone]   Nurse Advice Line: [phone]

## 2022-12-29 NOTE — OP NOTE
Procedure Date: 2022    PREOPERATIVE DIAGNOSIS:  Fibrous histiocytoma right groin.    POSTOPERATIVE DIAGNOSIS:  Fibrous histiocytoma right groin.    PROCEDURE:  Excision of a fibrous histiocytoma of the right groin.    ATTENDING SURGEON:  Tam Nunez MD.    ANESTHESIA:  Local with IV sedation.    INDICATIONS FOR PROCEDURE:  The patient is a 27-year-old woman who has had a growing subcutaneous nodule in her right groin.  She had a fine needle aspiration, which demonstrated a deep fibrous histiocytoma.  She now presents for surgical treatment.    DESCRIPTION OF PROCEDURE:  After informed consent, the patient was brought to the operating room, given IV sedation and prepped and draped in the usual fashion.  I administered local anesthetic to her right groin.  I drew out an elliptical skin incision around the mass, tried to obtain approximately 5 mm gross margin around the mass.  An elliptical skin incision was made with a scalpel.  The Bovie cautery was used to incise subcutaneous tissues.  There was a large vein coming out of the tumor, which was ligated and divided with a 3-0 silk tie.  The specimen was completely excised to obtain negative surgical margins.  The specimens wee oriented and sent to Pathology.  Strict hemostasis was obtained with the Bovie cautery.  The defect measured approximately 10 x 3 cm.  This was closed in layers with interrupted 3-0 Vicryl suture for the dermal layer, a running 4-0 Monocryl suture for the dermis and then I placed 3-0 chromic sutures in for further support.  Dermabond was placed and the patient was taken to the recovery room in stable condition.    Tam Nunez MD        D: 2022   T: 2022   MT: DELMY    Name:     ALESSANDRA CERVANTES  MRN:      [id number]        Account:        [id number]   :      1995           Procedure Date: 2022     Document: [id number]

## 2022-12-29 NOTE — ANESTHESIA POSTPROCEDURE EVALUATION
Patient: Ashley Darnell    Procedure: Procedure(s):  Excision of Right groin mass       Anesthesia Type:  General    Note:  Disposition: Outpatient   Postop Pain Control: Uneventful            Sign Out: Well controlled pain   PONV: No   Neuro/Psych: Uneventful            Sign Out: Acceptable/Baseline neuro status   Airway/Respiratory: Uneventful            Sign Out: Acceptable/Baseline resp. status   CV/Hemodynamics: Uneventful            Sign Out: Acceptable CV status; No obvious hypovolemia; No obvious fluid overload   Other NRE: NONE   DID A NON-ROUTINE EVENT OCCUR? No           Last vitals:  Vitals:    12/29/22 0855   BP: (!) 142/73   Pulse: 105   Resp: 16   Temp: 37.2  C (98.9  F)   SpO2: 97%       Electronically Signed By: GREGG Andrade CRNA  December 29, 2022  11:05 AM

## 2022-12-29 NOTE — INTERVAL H&P NOTE
"The History and Physical has been reviewed, the patient has been examined and no changes have occurred in the patient's condition since the H & P was completed.        Clinical Conditions Present on Arrival:  Clinically Significant Risk Factors Present on Admission                    # Severe Obesity: Estimated body mass index is 50.48 kg/m  as calculated from the following:    Height as of this encounter: 1.645 m (5' 4.75\").    Weight as of this encounter: 136.5 kg (301 lb).       "

## 2022-12-29 NOTE — ANESTHESIA CARE TRANSFER NOTE
Patient: Ashley Darnell    Procedure: Procedure(s):  Excision of Right groin mass       Diagnosis: Histiocytoma [D49.89]  Diagnosis Additional Information: No value filed.    Anesthesia Type:   General     Note:    Oropharynx: spontaneously breathing  Level of Consciousness: drowsy  Oxygen Supplementation: room air    Independent Airway: airway patency satisfactory and stable  Dentition: dentition unchanged  Vital Signs Stable: post-procedure vital signs reviewed and stable  Report to RN Given: handoff report given  Patient transferred to: Phase II    Handoff Report: Identifed the Patient, Identified the Reponsible Provider, Reviewed the pertinent medical history, Discussed the surgical course, Reviewed Intra-OP anesthesia mangement and issues during anesthesia, Set expectations for post-procedure period and Allowed opportunity for questions and acknowledgement of understanding      Vitals:  Vitals Value Taken Time   BP     Temp     Pulse     Resp     SpO2         Electronically Signed By: GREGG Andrade CRNA  December 29, 2022  11:04 AM

## 2023-01-02 LAB
PATH REPORT.COMMENTS IMP SPEC: NORMAL
PATH REPORT.FINAL DX SPEC: NORMAL
PATH REPORT.GROSS SPEC: NORMAL
PATH REPORT.MICROSCOPIC SPEC OTHER STN: NORMAL
PATH REPORT.RELEVANT HX SPEC: NORMAL
PHOTO IMAGE: NORMAL

## 2023-01-02 PROCEDURE — 88341 IMHCHEM/IMCYTCHM EA ADD ANTB: CPT | Mod: 26 | Performed by: PATHOLOGY

## 2023-01-02 PROCEDURE — 88305 TISSUE EXAM BY PATHOLOGIST: CPT | Mod: 26 | Performed by: PATHOLOGY

## 2023-01-02 PROCEDURE — 88342 IMHCHEM/IMCYTCHM 1ST ANTB: CPT | Mod: 26 | Performed by: PATHOLOGY

## 2023-01-12 ENCOUNTER — OFFICE VISIT (OUTPATIENT)
Dept: RADIATION THERAPY | Facility: OUTPATIENT CENTER | Age: 28
End: 2023-01-12
Payer: MEDICAID

## 2023-01-12 VITALS
HEART RATE: 101 BPM | OXYGEN SATURATION: 99 % | RESPIRATION RATE: 16 BRPM | WEIGHT: 293 LBS | SYSTOLIC BLOOD PRESSURE: 149 MMHG | BODY MASS INDEX: 50.48 KG/M2 | DIASTOLIC BLOOD PRESSURE: 97 MMHG

## 2023-01-12 DIAGNOSIS — D49.89 HISTIOCYTOMA: Primary | ICD-10-CM

## 2023-01-12 ASSESSMENT — PAIN SCALES - GENERAL: PAINLEVEL: NO PAIN (0)

## 2023-01-12 NOTE — PROGRESS NOTES
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  She has done well since her surgery with no postoperative complications.    PHYSICAL EXAMINATION:  Her incision is healing well with no evidence of infection.    IMPRESSION:  Postoperative check.    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  I did not recommend any additional surveillance.

## 2023-01-12 NOTE — NURSING NOTE
"Oncology Rooming Note    January 12, 2023 11:03 AM   Ashley Darnell is a 27 year old female who presents for:    Chief Complaint   Patient presents with     Surgical Followup     Post op appointment with Dr. Nunez     Initial Vitals: BP (!) 149/97   Pulse 101   Resp 16   Wt 136.5 kg (301 lb)   SpO2 99%   BMI 50.48 kg/m   Estimated body mass index is 50.48 kg/m  as calculated from the following:    Height as of 12/29/22: 1.645 m (5' 4.75\").    Weight as of this encounter: 136.5 kg (301 lb). Body surface area is 2.5 meters squared.  No Pain (0) Comment: Data Unavailable   No LMP recorded.  Allergies reviewed: Yes  Medications reviewed: Yes    Medications: Medication refills not needed today.  Pharmacy name entered into [employer]:    [id number] - [place], [place] - [id number]  [facility] - [place], [place] - [address] [place] [address]    Clinical concerns: Post op appointment Dr. Nunez was notified.      Sonya Elkins RN              "

## 2023-01-12 NOTE — LETTER
Date:January 17, 2023      Patient was self referred, no letter generated. Do not send.        [facility] Health Information

## 2023-01-12 NOTE — LETTER
1/12/2023         RE: Ashley Darnell  [address]  [place] [address]        Dear Colleague,    Thank you for referring your patient, Ashley Darnell, to the [facility]. Please see a copy of my visit note below.    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  She has done well since her surgery with no postoperative complications.    PHYSICAL EXAMINATION:  Her incision is healing well with no evidence of infection.    IMPRESSION:  Postoperative check.    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  I did not recommend any additional surveillance.          Again, thank you for allowing me to participate in the care of your patient.        Sincerely,        Tam Nunez MD

## 2023-01-31 LAB
PATH REPORT.ADDENDUM SPEC: ABNORMAL
PATH REPORT.COMMENTS IMP SPEC: ABNORMAL
PATH REPORT.COMMENTS IMP SPEC: YES
PATH REPORT.FINAL DX SPEC: ABNORMAL
PATH REPORT.GROSS SPEC: ABNORMAL
PATH REPORT.MICROSCOPIC SPEC OTHER STN: ABNORMAL
PATH REPORT.RELEVANT HX SPEC: ABNORMAL

## (undated) DEVICE — PREP CHLORAPREP 26ML TINTED ORANGE  260815

## (undated) DEVICE — GOWN XLG DISP 9545

## (undated) DEVICE — PACK LAPAROTOMY CUSTOM LAKES

## (undated) DEVICE — ESU ELEC BLADE 2.75" COATED/INSULATED E1455

## (undated) DEVICE — NDL 25GA 1.5" 305127

## (undated) DEVICE — SU SILK 3-0 SH 30" K832H

## (undated) DEVICE — SPONGE LAP 18X18" X8435

## (undated) DEVICE — SOL NACL 0.9% IRRIG 1000ML BOTTLE 2F7124

## (undated) DEVICE — ADH SKIN CLOSURE PREMIERPRO EXOFIN 1.0ML 3470

## (undated) DEVICE — STOCKING SLEEVE COMPRESSION CALF MED

## (undated) DEVICE — TUBING SUCTION 12"X1/4" N612

## (undated) DEVICE — GLOVE PROTEXIS W/NEU-THERA 8.0  2D73TE80

## (undated) DEVICE — SU CHROMIC 3-0 SH 27" G122H

## (undated) DEVICE — SUCTION TIP YANKAUER STR K87

## (undated) DEVICE — SU VICRYL 3-0 SH 27" J316H

## (undated) DEVICE — DECANTER VIAL 2006S

## (undated) RX ORDER — GABAPENTIN 300 MG/1
CAPSULE ORAL
Status: DISPENSED
Start: 2022-12-29

## (undated) RX ORDER — CEFAZOLIN SODIUM/WATER 2 G/20 ML
SYRINGE (ML) INTRAVENOUS
Status: DISPENSED
Start: 2022-12-29

## (undated) RX ORDER — LIDOCAINE HYDROCHLORIDE 10 MG/ML
INJECTION, SOLUTION EPIDURAL; INFILTRATION; INTRACAUDAL; PERINEURAL
Status: DISPENSED
Start: 2022-12-29

## (undated) RX ORDER — FENTANYL CITRATE 50 UG/ML
INJECTION, SOLUTION INTRAMUSCULAR; INTRAVENOUS
Status: DISPENSED
Start: 2022-12-29

## (undated) RX ORDER — FENTANYL CITRATE-0.9 % NACL/PF 10 MCG/ML
PLASTIC BAG, INJECTION (ML) INTRAVENOUS
Status: DISPENSED
Start: 2022-12-29

## (undated) RX ORDER — SCOLOPAMINE TRANSDERMAL SYSTEM 1 MG/1
PATCH, EXTENDED RELEASE TRANSDERMAL
Status: DISPENSED
Start: 2022-12-29

## (undated) RX ORDER — PROPOFOL 10 MG/ML
INJECTION, EMULSION INTRAVENOUS
Status: DISPENSED
Start: 2022-12-29

## (undated) RX ORDER — ACETAMINOPHEN 325 MG/1
TABLET ORAL
Status: DISPENSED
Start: 2022-12-29

## (undated) RX ORDER — BUPIVACAINE HYDROCHLORIDE AND EPINEPHRINE 2.5; 5 MG/ML; UG/ML
INJECTION, SOLUTION EPIDURAL; INFILTRATION; INTRACAUDAL; PERINEURAL
Status: DISPENSED
Start: 2022-12-29

## (undated) RX ORDER — LIDOCAINE HYDROCHLORIDE 20 MG/ML
JELLY TOPICAL
Status: DISPENSED
Start: 2022-12-29

## (undated) RX ORDER — DEXAMETHASONE SODIUM PHOSPHATE 10 MG/ML
INJECTION, SOLUTION INTRAMUSCULAR; INTRAVENOUS
Status: DISPENSED
Start: 2022-12-29

## (undated) RX ORDER — ONDANSETRON 2 MG/ML
INJECTION INTRAMUSCULAR; INTRAVENOUS
Status: DISPENSED
Start: 2022-12-29

## (undated) RX ORDER — LIDOCAINE HYDROCHLORIDE 10 MG/ML
INJECTION, SOLUTION INFILTRATION; PERINEURAL
Status: DISPENSED
Start: 2022-12-29

## (undated) RX ORDER — CEFAZOLIN SODIUM 1 G/3ML
INJECTION, POWDER, FOR SOLUTION INTRAMUSCULAR; INTRAVENOUS
Status: DISPENSED
Start: 2022-12-29